# Patient Record
Sex: MALE | Race: WHITE | NOT HISPANIC OR LATINO | URBAN - METROPOLITAN AREA
[De-identification: names, ages, dates, MRNs, and addresses within clinical notes are randomized per-mention and may not be internally consistent; named-entity substitution may affect disease eponyms.]

---

## 2023-02-13 ENCOUNTER — EMERGENCY (EMERGENCY)
Facility: HOSPITAL | Age: 58
LOS: 1 days | Discharge: HOME | End: 2023-02-13
Attending: EMERGENCY MEDICINE | Admitting: EMERGENCY MEDICINE
Payer: MEDICARE

## 2023-02-13 VITALS
RESPIRATION RATE: 19 BRPM | HEART RATE: 76 BPM | DIASTOLIC BLOOD PRESSURE: 77 MMHG | TEMPERATURE: 98 F | WEIGHT: 175.05 LBS | OXYGEN SATURATION: 98 % | HEIGHT: 73 IN | SYSTOLIC BLOOD PRESSURE: 129 MMHG

## 2023-02-13 PROCEDURE — 72125 CT NECK SPINE W/O DYE: CPT | Mod: 26

## 2023-02-13 PROCEDURE — 99284 EMERGENCY DEPT VISIT MOD MDM: CPT

## 2023-02-13 RX ORDER — ACETAMINOPHEN 500 MG
650 TABLET ORAL ONCE
Refills: 0 | Status: COMPLETED | OUTPATIENT
Start: 2023-02-13 | End: 2023-02-13

## 2023-02-13 RX ORDER — IBUPROFEN 200 MG
600 TABLET ORAL ONCE
Refills: 0 | Status: COMPLETED | OUTPATIENT
Start: 2023-02-13 | End: 2023-02-13

## 2023-02-13 RX ADMIN — Medication 600 MILLIGRAM(S): at 14:58

## 2023-02-13 RX ADMIN — Medication 650 MILLIGRAM(S): at 14:58

## 2023-02-13 NOTE — ED ADULT NURSE NOTE - CHIEF COMPLAINT QUOTE
Pt visiting from [place] for about 7 weeks now. States on 11/16/22 had a spinal infusion. For about 2.5 weeks now pt has noticed some numbness in both fingers of both hands, and numbness in left foot. Numbness is intermittent, but has made it difficult for him to sleep on his right side, shave his face, and write with hand. Pt's surgeon (Lamar Sandoval [phone]) recommended he come to ED for a CT neck. Pt is also concerned about urinary frequency, headaches, and dizziness he has been also having for 2.5 weeks.

## 2023-02-13 NOTE — ED PROVIDER NOTE - CLINICAL SUMMARY MEDICAL DECISION MAKING FREE TEXT BOX
57-year-old gentleman no significant past medical history status post ACDF in mid November presents with bilateral upper extremity paresthesias, decreased  strength, decreased fine motor skills of his hands with occasional flares of pain.  Symptoms have been occurring for the past 2 to 3 weeks.  Patient called his neurosurgeon in [place], Dr. Lamar Sandoval, who requested that he get CT imaging of his neck from the emergency department and is going to schedule an MRI as an outpatient.  Patient denies infectious symptoms or other complications from the surgery.    On exam, has decreased sensation to the median nerve distribution and 4/5  strength in bilateral upper extremities.  Otherwise unremarkable physical exam.    After speaking with his neurosurgeons PA, they requested noncontrast CT of the neck to evaluate cause of pain.  Patient requesting medication for headache, will admit administer analgesics.

## 2023-02-13 NOTE — ED PROVIDER NOTE - PROGRESS NOTE DETAILS
Spoke with patient and his neurosurgeons office regarding the findings of today's CT scan.  They would like him to be discharged with a Medrol Dosepak and to follow-up with an MRI that they will schedule and his previously appointed follow-up examination in early March.  Patient amenable to this plan.

## 2023-02-13 NOTE — ED PROVIDER NOTE - OBJECTIVE STATEMENT
57-year-old gentleman no significant past medical history status post ACDF in mid November of 2022 presents with bilateral upper extremity paresthesias, decreased  strength, decreased fine motor skills of his hands with occasional flares of pain.  Symptoms have been occurring for the past 2 to 3 weeks.  Patient called his neurosurgeon in [place], Dr. Lamar Sandoval, who requested that he get CT imaging of his neck from the emergency department and is going to schedule an MRI as an outpatient.  Patient denies infectious symptoms or other complications from the surgery.

## 2023-02-13 NOTE — ED ADULT TRIAGE NOTE - CHIEF COMPLAINT QUOTE
Pt visiting from [place] for about 7 weeks now. States on 11/16/22 had a spinal infusion. For about 2.5 weeks now pt has noticed some numbness in both fingers of both hands, and numbness in left foot. Numbness is intermittent, but has made it difficult for him to sleep on his right side, shave his face, and write with hand. Pt's surgeon (Lamar Sandoval [phone]) recommended he come to ED for a CT neck. Pt is also concerned about urinary frequency, headaches, and dizziness he has been also having for 2.5 weeks.

## 2023-02-13 NOTE — ED PROVIDER NOTE - NSFOLLOWUPINSTRUCTIONS_ED_ALL_ED_FT
CT imaging revealed moderate to severe foraminal narrowing from C3-C7.  Please follow-up with your neurosurgeon regarding these results and further recommendations for management. Please read all handouts provided to you from the emergency department.  Seek immediate medical attention for any new/worsening signs or symptoms.  You may take ibuprofen 600 mg every 6 hours and/or acetaminophen 650 mg every 4-6 hours as needed for pain.      Acute Neck Pain    WHAT YOU NEED TO KNOW:    Acute neck pain starts suddenly, increases quickly, and goes away in a few days. The pain may come and go, or be worse with certain movements. The pain may be only in your neck, or it may move to your arms, back, or shoulders. You may also have pain that starts in another body area and moves to your neck.   Vertebral Column         DISCHARGE INSTRUCTIONS:    Return to the emergency department if:   •You have an injury that causes neck pain and shooting pain down your arms or legs.      •Your neck pain suddenly becomes severe.      •You have neck pain along with numbness, tingling, or weakness in your arms or legs.      •You have a stiff neck, a headache, and a fever.      Call your doctor if:   •You have new or worsening symptoms.      •Your symptoms continue even after treatment.      •You have questions or concerns about your condition or care.      Medicines: You may need any of the following:  •NSAIDs, such as ibuprofen, help decrease swelling, pain, and fever. This medicine is available with or without a doctor's order. NSAIDs can cause stomach bleeding or kidney problems in certain people. If you take blood thinner medicine, always ask your healthcare provider if NSAIDs are safe for you. Always read the medicine label and follow directions.      •Acetaminophen decreases pain and fever. It is available without a doctor's order. Ask how much to take and how often to take it. Follow directions. Read the labels of all other medicines you are using to see if they also contain acetaminophen, or ask your doctor or pharmacist. Acetaminophen can cause liver damage if not taken correctly.      •Steroid medicine may be used to reduce inflammation. This can help relieve pain caused by swelling.      •Muscle relaxers help relax tense muscles and can prevent muscle spasms.      •Nerve medicine may be given if your pain is caused by a nerve problem.      •Take your medicine as directed. Contact your healthcare provider if you think your medicine is not helping or if you have side effects. Tell your provider if you are allergic to any medicine. Keep a list of the medicines, vitamins, and herbs you take. Include the amounts, and when and why you take them. Bring the list or the pill bottles to follow-up visits. Carry your medicine list with you in case of an emergency.      Manage or prevent acute neck pain:   •Rest your neck as directed. Do not make sudden movements, such as turning your head quickly. Your healthcare provider may recommend you wear a cervical collar for a short time. The collar will prevent you from moving your head. This will give your neck time to heal if an injury is causing your neck pain. Ask your healthcare provider when you can return to sports or other normal daily activities.  Cervical Collars           •Apply heat as directed. Heat helps relieve pain and swelling. Use a heat wrap, or soak a small towel in warm water. Wring out the extra water. Apply the heat wrap or towel for 20 minutes every hour, or as directed.      •Apply ice as directed. Ice helps relieve pain and swelling, and can help prevent tissue damage. Use an ice pack, or put ice in a bag. Cover the ice pack or back with a towel before you apply it to your neck. Apply the ice pack or ice for 15 minutes every hour, or as directed. Your healthcare provider can tell you how often to apply ice.      •Do neck exercises as directed. Neck exercises help strengthen the muscles and increase range of motion. Your healthcare provider will tell you which exercises are right for you. He or she may give you instructions or recommend that you work with a physical therapist. Your healthcare provider or therapist can make sure you are doing the exercises correctly.      •Maintain good posture. Try to keep your head and shoulders lifted when you sit. If you work in front of a computer, make sure the monitor is at the right level. You should not need to look up down to see the screen. You should also not have to lean forward to be able to read what is on the screen. Make sure your keyboard, mouse, and other computer items are placed where you do not have to extend your shoulder to reach them. Get up often if you work in front of a computer or sit for long periods of time. Stretch or walk around to keep your neck muscles loose.             Follow up with your doctor as directed: He or she may refer you to a specialist if your pain does not get better with treatment. Write down your questions so you remember to ask them during your visits.

## 2023-02-13 NOTE — ED PROVIDER NOTE - PHYSICAL EXAMINATION
On exam, has decreased sensation to the median nerve distribution and 4/5  strength in bilateral upper extremities.  Otherwise unremarkable physical exam.

## 2023-02-13 NOTE — ED PROVIDER NOTE - PATIENT PORTAL LINK FT
You can access the FollowMyHealth Patient Portal offered by [facility] by registering at the following website: [email or web address]. By joining [employer]’s FollowMyHealth portal, you will also be able to view your health information using other applications (apps) compatible with our system.

## 2023-02-14 ENCOUNTER — NON-APPOINTMENT (OUTPATIENT)
Age: 58
End: 2023-02-14

## 2023-02-14 DIAGNOSIS — Z88.0 ALLERGY STATUS TO PENICILLIN: ICD-10-CM

## 2023-02-14 DIAGNOSIS — Z98.1 ARTHRODESIS STATUS: ICD-10-CM

## 2023-02-14 DIAGNOSIS — M48.02 SPINAL STENOSIS, CERVICAL REGION: ICD-10-CM

## 2023-02-14 DIAGNOSIS — Z88.8 ALLERGY STATUS TO OTHER DRUGS, MEDICAMENTS AND BIOLOGICAL SUBSTANCES STATUS: ICD-10-CM

## 2023-02-14 DIAGNOSIS — R20.2 PARESTHESIA OF SKIN: ICD-10-CM

## 2023-02-15 ENCOUNTER — APPOINTMENT (OUTPATIENT)
Dept: MRI IMAGING | Facility: CLINIC | Age: 58
End: 2023-02-15
Payer: MEDICARE

## 2023-02-15 ENCOUNTER — OUTPATIENT (OUTPATIENT)
Dept: OUTPATIENT SERVICES | Facility: HOSPITAL | Age: 58
LOS: 1 days | End: 2023-02-15

## 2023-02-15 PROCEDURE — 72141 MRI NECK SPINE W/O DYE: CPT | Mod: 26

## 2024-10-14 ENCOUNTER — APPOINTMENT (OUTPATIENT)
Dept: RADIOLOGY | Facility: CLINIC | Age: 59
End: 2024-10-14

## 2024-10-14 ENCOUNTER — OUTPATIENT (OUTPATIENT)
Dept: OUTPATIENT SERVICES | Facility: HOSPITAL | Age: 59
LOS: 1 days | End: 2024-10-14
Payer: MEDICARE

## 2024-10-14 ENCOUNTER — NON-APPOINTMENT (OUTPATIENT)
Age: 59
End: 2024-10-14

## 2024-10-14 PROCEDURE — 71046 X-RAY EXAM CHEST 2 VIEWS: CPT | Mod: 26

## 2024-10-18 ENCOUNTER — NON-APPOINTMENT (OUTPATIENT)
Age: 59
End: 2024-10-18